# Patient Record
Sex: FEMALE | Race: WHITE | NOT HISPANIC OR LATINO | Employment: FULL TIME | URBAN - METROPOLITAN AREA
[De-identification: names, ages, dates, MRNs, and addresses within clinical notes are randomized per-mention and may not be internally consistent; named-entity substitution may affect disease eponyms.]

---

## 2020-05-11 ENCOUNTER — APPOINTMENT (OUTPATIENT)
Dept: LAB | Facility: HOSPITAL | Age: 40
End: 2020-05-11

## 2020-05-11 ENCOUNTER — OFFICE VISIT (OUTPATIENT)
Dept: ENDOCRINOLOGY | Facility: CLINIC | Age: 40
End: 2020-05-11

## 2020-05-11 VITALS
OXYGEN SATURATION: 98 % | HEIGHT: 72 IN | BODY MASS INDEX: 17.23 KG/M2 | DIASTOLIC BLOOD PRESSURE: 80 MMHG | SYSTOLIC BLOOD PRESSURE: 128 MMHG | WEIGHT: 127.2 LBS | HEART RATE: 75 BPM

## 2020-05-11 DIAGNOSIS — E89.0 POSTSURGICAL HYPOTHYROIDISM: Primary | ICD-10-CM

## 2020-05-11 DIAGNOSIS — E83.51 HYPOCALCEMIA: ICD-10-CM

## 2020-05-11 DIAGNOSIS — Z85.850 HISTORY OF THYROID CANCER: ICD-10-CM

## 2020-05-11 PROBLEM — F32.A DEPRESSED: Status: ACTIVE | Noted: 2020-05-11

## 2020-05-11 PROCEDURE — 84432 ASSAY OF THYROGLOBULIN: CPT | Performed by: INTERNAL MEDICINE

## 2020-05-11 PROCEDURE — 86800 THYROGLOBULIN ANTIBODY: CPT | Performed by: INTERNAL MEDICINE

## 2020-05-11 PROCEDURE — 84443 ASSAY THYROID STIM HORMONE: CPT | Performed by: INTERNAL MEDICINE

## 2020-05-11 PROCEDURE — 99204 OFFICE O/P NEW MOD 45 MIN: CPT | Performed by: INTERNAL MEDICINE

## 2020-05-11 PROCEDURE — 80053 COMPREHEN METABOLIC PANEL: CPT | Performed by: INTERNAL MEDICINE

## 2020-05-11 PROCEDURE — 84439 ASSAY OF FREE THYROXINE: CPT | Performed by: INTERNAL MEDICINE

## 2020-05-11 RX ORDER — CALCITRIOL 0.5 UG/1
0.5 CAPSULE, LIQUID FILLED ORAL DAILY
Qty: 90 CAPSULE | Refills: 3 | Status: SHIPPED | OUTPATIENT
Start: 2020-05-11 | End: 2021-05-12 | Stop reason: SDUPTHER

## 2020-05-11 RX ORDER — LINACLOTIDE 290 UG/1
1 CAPSULE, GELATIN COATED ORAL DAILY
COMMUNITY
Start: 2020-03-24

## 2020-05-11 RX ORDER — LEVOTHYROXINE SODIUM 0.12 MG/1
1 TABLET ORAL DAILY
COMMUNITY
Start: 2020-03-06 | End: 2020-05-14 | Stop reason: SDUPTHER

## 2020-05-11 RX ORDER — CALCITRIOL 0.5 UG/1
1 CAPSULE, LIQUID FILLED ORAL DAILY
COMMUNITY
Start: 2020-04-20 | End: 2020-05-11 | Stop reason: SDUPTHER

## 2020-05-11 RX ORDER — DESVENLAFAXINE 100 MG/1
1 TABLET, EXTENDED RELEASE ORAL DAILY
COMMUNITY
Start: 2020-03-30

## 2020-05-11 NOTE — PROGRESS NOTES
"Chief Complaint   Patient presents with   • Establish Care   • Hypothyroidism   • Hypocalcemia        Referring Provider  Marilu Almazan, *     HPI   Isabela Myles is a 40 y.o. female had concerns including Establish Care; Hypothyroidism; and Hypocalcemia.   Had thyroidectomy in 2009 followed by MARTIN ablation. She also had external beam radiation for thyroid cancer that had \"spread to the lymph nodes\".   She also had whole body scans yearly until a few years ago. Treatment was through [facility] in [place].     She was seeing Nader Taylor at [facility] in the past.     Her last TSH from March 3/4/20 was 6 and her levothyroxine was increased from 100 mcg to 125 mcg by PCP. TSH from 6/11/2019 was suppressed at 0.09.  TSH from 8/22/2019 was 0.22.  She does not recall what doses of levothyroxine she was on with these lab draws.  Notes that her dose was changed a few times over the last year or so.    She is due for thyroid labs today. She is feeling well. Was feeling poorly on the 100 mcg dose when her TSH was up to 6.0.    She has postoperative hypocalcemia since the thyroidectomy.  Immediately after surgery she was on up to 16 tablets of calcium supplementation through the day and she is now only on 2.  She also takes calcitriol daily.  Last calcium level from 6/11/2019 was normal at 9.9 with an albumin of 4.4.    Past Medical History:   Diagnosis Date   • Depression    • Hypocalcemia    • Postoperative hypothyroidism    • Stroke (cerebrum) (CMS/HCC)    • Thyroid cancer (CMS/HCC)      Past Surgical History:   Procedure Laterality Date   • THYROID SURGERY        Family History   Problem Relation Age of Onset   • Cancer Maternal Grandmother    • Diabetes Maternal Grandfather       Social History     Socioeconomic History   • Marital status:      Spouse name: Not on file   • Number of children: Not on file   • Years of education: Not on file   • Highest education level: Not on file   Tobacco Use   • " "Smoking status: Never Smoker   • Smokeless tobacco: Never Used   Substance and Sexual Activity   • Alcohol use: Never     Frequency: Never   • Drug use: Never   • Sexual activity: Defer      No Known Allergies   Current Outpatient Medications on File Prior to Visit   Medication Sig Dispense Refill   • desvenlafaxine (PRISTIQ) 100 MG 24 hr tablet Take 1 tablet by mouth Daily.     • levonorgestrel (MIRENA) 20 MCG/24HR IUD 1 each by Intrauterine route 1 (One) Time.     • levothyroxine (SYNTHROID, LEVOTHROID) 125 MCG tablet Take 1 tablet by mouth Daily.     • LINZESS 290 MCG capsule capsule Take 1 capsule by mouth Daily.     • [DISCONTINUED] calcitriol (ROCALTROL) 0.5 MCG capsule Take 1 capsule by mouth Daily.       No current facility-administered medications on file prior to visit.         Review of Systems   Constitutional: Negative.    HENT: Negative.    Eyes: Negative.    Respiratory: Negative.    Cardiovascular: Negative.    Gastrointestinal: Positive for constipation (pt suffers from constipation).   Endocrine: Positive for cold intolerance (pt is always cold).   Genitourinary: Negative.    Musculoskeletal: Negative.    Skin: Negative.    Allergic/Immunologic: Negative.    Neurological: Negative.    Psychiatric/Behavioral: Positive for depressed mood (pt takes med for depression and anxiety).      ROS was reviewed and verified. All other ROS negative.    /80   Pulse 75   Ht 198.1 cm (78\")   Wt 57.7 kg (127 lb 3.2 oz)   SpO2 98%   BMI 14.70 kg/m²      Physical Exam    Constitutional:  well developed; well nourished  no acute distress   ENT/Thyroid: surgically absent  no palpable lymphadenopathy   Eyes: EOM intact  Conjunctiva: clear   Respiratory:  breathing is unlabored  clear to auscultation bilaterally   Cardiovascular:  regular rate and rhythm, S1, S2 normal, no murmur, click, rub or gallop   Chest:  Not performed.   Abdomen: Not performed.   : Not performed.   Musculoskeletal: negative findings:  " ROM of all joints is normal, no deformities present   Skin: dry and warm   Neuro: normal without focal findings, mental status, speech normal, alert and oriented x3 and JACQUELINE   Psych: oriented to time, place and person, mood and affect are within normal limits     Labs  See HPI    Assessment and Plan    Isabela was seen today for establish care, hypothyroidism and hypocalcemia.    Diagnoses and all orders for this visit:    Postsurgical hypothyroidism  Recheck TFTs today. TSH goal is determined based on the pathology from her thyroidectomy for thyroid cancer in 2009. TSH should at least be < 2.0 due to history of thyroid cancer.   Currently on levothyroxine 125 mcg daily and pt is clinically euthyroid.   Records are being requested.   Titrate TSH if needed.  -     T4, Free  -     TSH    History of thyroid cancer  Records will be requested. Pt underwent complete thyroidectomy in 2009 and reports some involvement of lymph nodes. Treatment was followed up with MARTIN therapy and external beam radiation due to lymph node involvement.   Check neck US at follow-up. Routine or yearly whole body scans are not indicated unless TG level is trending up.   Check TG and antibody level.    -     Thyroglobulin + Thyroglobulin Antibody ([facility])    Hypocalcemia  Postoperative from thyroidectomy though controlled on calcitriol and calcium supplementation.   Calcium level was 9.9 from 6/2019. Recheck now. Refill calcitriol.   -     Comprehensive Metabolic Panel  -     calcitriol (ROCALTROL) 0.5 MCG capsule; Take 1 capsule by mouth Daily.     **Addendum 5/18/2020:    The operative report from patient's surgery on 12/8/2018 was received.  The final pathology report has not been received.  From the operative report there was gross capsular extension with adhesions to the surrounding strap muscles and to the undersurface of structures posteriorly, metastasis to the right upper jugular lymph node.  Initial TSH goal would have been less than 0.1.   Within 5 years TSH goal 0.1-0.5 and after 5 years with an excellent response maintenance of TSH less than 2 is appropriate.    Return in about 3 months (around 8/11/2020) for next scheduled follow up with ultrasound (30 minutes). The patient was instructed to contact the clinic with any interval questions or concerns.    Ema Robles, DO   Endocrinologist    Please note that portions of this document were completed with a voice recognition program. Efforts were made to edit the dictations, but occasionally words are mis-transcribed.

## 2020-05-12 LAB
ALBUMIN SERPL-MCNC: 4.6 G/DL (ref 3.5–5.2)
ALBUMIN/GLOB SERPL: 1.8 G/DL
ALP SERPL-CCNC: 43 U/L (ref 39–117)
ALT SERPL W P-5'-P-CCNC: 13 U/L (ref 1–33)
ANION GAP SERPL CALCULATED.3IONS-SCNC: 14.7 MMOL/L (ref 5–15)
AST SERPL-CCNC: 20 U/L (ref 1–32)
BILIRUB SERPL-MCNC: 0.5 MG/DL (ref 0.2–1.2)
BUN BLD-MCNC: 18 MG/DL (ref 6–20)
BUN/CREAT SERPL: 14.8 (ref 7–25)
CALCIUM SPEC-SCNC: 8.2 MG/DL (ref 8.6–10.5)
CHLORIDE SERPL-SCNC: 97 MMOL/L (ref 98–107)
CO2 SERPL-SCNC: 26.3 MMOL/L (ref 22–29)
CREAT BLD-MCNC: 1.22 MG/DL (ref 0.57–1)
GFR SERPL CREATININE-BSD FRML MDRD: 49 ML/MIN/1.73
GLOBULIN UR ELPH-MCNC: 2.5 GM/DL
GLUCOSE BLD-MCNC: 72 MG/DL (ref 65–99)
POTASSIUM BLD-SCNC: 3.9 MMOL/L (ref 3.5–5.2)
PROT SERPL-MCNC: 7.1 G/DL (ref 6–8.5)
SODIUM BLD-SCNC: 138 MMOL/L (ref 136–145)
T4 FREE SERPL-MCNC: 1.36 NG/DL (ref 0.93–1.7)
TSH SERPL DL<=0.05 MIU/L-ACNC: 1.36 UIU/ML (ref 0.27–4.2)

## 2020-05-14 ENCOUNTER — TELEPHONE (OUTPATIENT)
Dept: ENDOCRINOLOGY | Facility: CLINIC | Age: 40
End: 2020-05-14

## 2020-05-14 DIAGNOSIS — E89.0 POSTSURGICAL HYPOTHYROIDISM: Primary | ICD-10-CM

## 2020-05-14 PROBLEM — Z86.73 HISTORY OF CEREBROVASCULAR ACCIDENT: Status: ACTIVE | Noted: 2017-01-17

## 2020-05-14 LAB — REF LAB TEST METHOD: NORMAL

## 2020-05-14 RX ORDER — LEVOTHYROXINE SODIUM 0.12 MG/1
125 TABLET ORAL DAILY
Qty: 90 TABLET | Refills: 3 | Status: SHIPPED | OUTPATIENT
Start: 2020-05-14 | End: 2021-05-12 | Stop reason: SDUPTHER

## 2020-05-14 NOTE — TELEPHONE ENCOUNTER
Spoke with Javier in [facility] and they are checking in the old records for these, they stated they would have the OP note but possibly not the treatment notes. They are going to fax it all over today if they have it available. Spoke with Sherif Harris and they are faxing over her records today as well.

## 2020-05-14 NOTE — TELEPHONE ENCOUNTER
----- Message from Ema Robles DO sent at 5/12/2020 12:08 PM EDT -----  Regarding: records  Pt indicated she signed a records release at her visit yesterday but I don't see it scanned into her chart. I need her prior records from [facility] and records from [facility] in [place] from ~2009 on for her thyroid surgery and treatments. Can you help get these? May need to have her call [facility] medical records would be easiest and not sure if [facility] will send anything without records release. Thank you!

## 2020-08-06 ENCOUNTER — OFFICE VISIT (OUTPATIENT)
Dept: ENDOCRINOLOGY | Facility: CLINIC | Age: 40
End: 2020-08-06

## 2020-08-06 ENCOUNTER — LAB (OUTPATIENT)
Dept: LAB | Facility: HOSPITAL | Age: 40
End: 2020-08-06

## 2020-08-06 VITALS
HEART RATE: 66 BPM | OXYGEN SATURATION: 99 % | WEIGHT: 124.2 LBS | HEIGHT: 66 IN | DIASTOLIC BLOOD PRESSURE: 74 MMHG | SYSTOLIC BLOOD PRESSURE: 120 MMHG | BODY MASS INDEX: 19.96 KG/M2

## 2020-08-06 DIAGNOSIS — E83.51 HYPOCALCEMIA: ICD-10-CM

## 2020-08-06 DIAGNOSIS — Z85.850 HISTORY OF THYROID CANCER: Primary | ICD-10-CM

## 2020-08-06 DIAGNOSIS — E89.0 POSTSURGICAL HYPOTHYROIDISM: ICD-10-CM

## 2020-08-06 PROCEDURE — 99214 OFFICE O/P EST MOD 30 MIN: CPT | Performed by: INTERNAL MEDICINE

## 2020-08-06 PROCEDURE — 80053 COMPREHEN METABOLIC PANEL: CPT | Performed by: INTERNAL MEDICINE

## 2020-08-06 PROCEDURE — 76536 US EXAM OF HEAD AND NECK: CPT | Performed by: INTERNAL MEDICINE

## 2020-08-06 NOTE — PROGRESS NOTES
"Chief Complaint   Patient presents with   • Hypothyroidism     Follow up & U/S        HPI   Isabela Myles is a 40 y.o. female had concerns including Hypothyroidism (Follow up & U/S).      Here for follow up on postoperative hypothyroidism and thyroid cancer. Is on levothyroxine 125 mcg daily and last TSH from May was 1.36.     TG levels are low/undetectable with a negative thyroid antibody.     Pt has postoperative hypocalcemia also and recent calcium was slightly low. I advised one additional calcium tablet daily, which patient has been taking.     She feels great and denies any new concerns or complaints today.    The following portions of the patient's history were reviewed and updated as appropriate: allergies, current medications, past family history, past medical history, past social history, past surgical history and problem list.    Review of Systems   Constitutional: Negative.    HENT: Negative.    Eyes: Negative.    Respiratory: Negative.    Cardiovascular: Negative.    Gastrointestinal: Negative.    Endocrine: Negative.    Genitourinary: Negative.    Musculoskeletal: Negative.    Skin: Negative.    Allergic/Immunologic: Negative.    Neurological: Negative.    Hematological: Negative.    Psychiatric/Behavioral: Negative.       ROS was reviewed and verified. All other ROS negative.    /74   Pulse 66   Ht 167.6 cm (66\")   Wt 56.3 kg (124 lb 3.2 oz)   SpO2 99%   BMI 20.05 kg/m²      Physical Exam     Constitutional:  well developed; well nourished  no acute distress   ENT/Thyroid: surgically absent  no palpable lymphadenopathy   Eyes: EOM intact  Conjunctiva: clear   Respiratory:  breathing is unlabored  clear to auscultation bilaterally   Cardiovascular:  regular rate and rhythm, S1, S2 normal, no murmur, click, rub or gallop   Chest:  Not performed.   Abdomen: Not performed.   : Not performed.   Musculoskeletal: negative findings:  ROM of all joints is normal, no deformities present   Skin: dry " and warm   Neuro: normal without focal findings and mental status, speech normal, alert and oriented x3   Psych: oriented to time, place and person, mood and affect are within normal limits     CMP  Lab Results   Component Value Date    GLUCOSE 72 2020    BUN 18 2020    CREATININE 1.22 (H) 2020    EGFRIFNONA 49 (L) 2020    BCR 14.8 2020    K 3.9 2020    CO2 26.3 2020    CALCIUM 8.2 (L) 2020    ALBUMIN 4.60 2020    AST 20 2020    ALT 13 2020      TSH  Lab Results   Component Value Date    TSH 1.360 2020       T4  Lab Results   Component Value Date    FREET4 1.36 2020     Thyroglobulin from 2020 undetectable, <0.1 and thyroglobulin antibody undetectable <1.0.    Post-operative Neck Ultrasound  [facility]    PT: Isabela Myles  : 1980  Date:  20  Indication: History of thyroid cancer.    Technique: Real time high resolution imaging of the anterior neck was performed in longitudinal and transverse planes.    Findings:   Compartment / Level I  (Submandibular): no abnormal lymph nodules bilaterally  Compartment / Level II  (Suprahyoid parajugular): no abnormal lymph nodules bilaterally  Compartment / Level III  (Infrahyoid supracricoid parajugular): no abnormal lymph nodules bilaterally  Compartment / Level IV (Infracricoid parajugular): no abnormal lymph nodules bilaterally  Compartment / Level V (Posterior cervical triangle): no abnormal lymph nodules bilaterally  Compartment / Level VI (Central compartment): no abnormal lymph nodules identified  Compartment / Level VII (Substernal space): no abnormal lymph nodules bilaterally    Impression: The structures of the neck are shifted medially as expected post thyroidectomy. The thyroid bed is unremarkable.   No abnormal lymph nodes were identified in surgical anatomic compartments I - VII.      Recommendation: Repeat Neck US recommended as clinically  indicated.      Assessment and Plan    Isabela was seen today for hypothyroidism.    Diagnoses and all orders for this visit:    History of thyroid cancer  High risk papillary with gross capsular extension with adhesions to the surrounding strap muscles and to the undersurface of structures posteriorly, metastasis to the right upper jugular lymph node. Resection 12/8/2009. S/p I-131 and external beam radiation. Excellent response to therapy.   TG and TG antibody undetectable.   Neck US normal today. Repeat every 1-2 years as clinically indicated.   -     US Thyroid    Postsurgical hypothyroidism  Clinically euthyroid on levothyroxine 125 mcg daily.  Last TSH was at goal at 1.36 3 February 2020.  Target TSH 0.5-2.0 most patients greater than 5 years out from surgery.    Hypocalcemia  Hypocalcemia controlled with calcitriol and calcium tablets daily.  Increase her calcium tablets by 1 additional daily after her most recent calcium level is low.  Recheck level today.  She is symptomatic if calcium levels drop.  -     Comprehensive Metabolic Panel         Return in about 9 months (around 5/6/2021) for next scheduled follow up. The patient was instructed to contact the clinic with any interval questions or concerns.    Ema Robles, DO   Endocrinologist    Please note that portions of this document were completed with a voice recognition program. Efforts were made to edit the dictations, but occasionally words are mis-transcribed.

## 2020-08-07 LAB
ALBUMIN SERPL-MCNC: 4.5 G/DL (ref 3.5–5.2)
ALBUMIN/GLOB SERPL: 1.9 G/DL
ALP SERPL-CCNC: 40 U/L (ref 39–117)
ALT SERPL W P-5'-P-CCNC: 11 U/L (ref 1–33)
ANION GAP SERPL CALCULATED.3IONS-SCNC: 13.3 MMOL/L (ref 5–15)
AST SERPL-CCNC: 20 U/L (ref 1–32)
BILIRUB SERPL-MCNC: 0.3 MG/DL (ref 0–1.2)
BUN SERPL-MCNC: 15 MG/DL (ref 6–20)
BUN/CREAT SERPL: 12.4 (ref 7–25)
CALCIUM SPEC-SCNC: 7.9 MG/DL (ref 8.6–10.5)
CHLORIDE SERPL-SCNC: 98 MMOL/L (ref 98–107)
CO2 SERPL-SCNC: 27.7 MMOL/L (ref 22–29)
CREAT SERPL-MCNC: 1.21 MG/DL (ref 0.57–1)
GFR SERPL CREATININE-BSD FRML MDRD: 49 ML/MIN/1.73
GLOBULIN UR ELPH-MCNC: 2.4 GM/DL
GLUCOSE SERPL-MCNC: 52 MG/DL (ref 65–99)
POTASSIUM SERPL-SCNC: 3.1 MMOL/L (ref 3.5–5.2)
PROT SERPL-MCNC: 6.9 G/DL (ref 6–8.5)
SODIUM SERPL-SCNC: 139 MMOL/L (ref 136–145)

## 2020-08-12 DIAGNOSIS — E83.51 HYPOCALCEMIA: Primary | ICD-10-CM

## 2020-08-12 RX ORDER — PHENOL 1.4 %
1200 AEROSOL, SPRAY (ML) MUCOUS MEMBRANE DAILY
COMMUNITY

## 2020-09-12 ENCOUNTER — RESULTS ENCOUNTER (OUTPATIENT)
Dept: ENDOCRINOLOGY | Facility: CLINIC | Age: 40
End: 2020-09-12

## 2020-09-12 DIAGNOSIS — E83.51 HYPOCALCEMIA: ICD-10-CM

## 2021-05-03 ENCOUNTER — OFFICE VISIT (OUTPATIENT)
Dept: ENDOCRINOLOGY | Facility: CLINIC | Age: 41
End: 2021-05-03

## 2021-05-03 ENCOUNTER — LAB (OUTPATIENT)
Dept: LAB | Facility: HOSPITAL | Age: 41
End: 2021-05-03

## 2021-05-03 VITALS
OXYGEN SATURATION: 99 % | TEMPERATURE: 98.4 F | HEIGHT: 66 IN | SYSTOLIC BLOOD PRESSURE: 120 MMHG | DIASTOLIC BLOOD PRESSURE: 80 MMHG | WEIGHT: 128.4 LBS | BODY MASS INDEX: 20.63 KG/M2 | HEART RATE: 74 BPM

## 2021-05-03 DIAGNOSIS — E83.51 HYPOCALCEMIA: ICD-10-CM

## 2021-05-03 DIAGNOSIS — Z85.850 HISTORY OF THYROID CANCER: ICD-10-CM

## 2021-05-03 DIAGNOSIS — E89.0 POSTSURGICAL HYPOTHYROIDISM: Primary | ICD-10-CM

## 2021-05-03 LAB
25(OH)D3 SERPL-MCNC: 84.5 NG/ML
ALBUMIN SERPL-MCNC: 4.5 G/DL (ref 3.5–5.2)
ALBUMIN/GLOB SERPL: 1.8 G/DL
ALP SERPL-CCNC: 54 U/L (ref 39–117)
ALT SERPL W P-5'-P-CCNC: 14 U/L (ref 1–33)
ANION GAP SERPL CALCULATED.3IONS-SCNC: 12.4 MMOL/L (ref 5–15)
AST SERPL-CCNC: 17 U/L (ref 1–32)
BILIRUB SERPL-MCNC: 0.3 MG/DL (ref 0–1.2)
BUN SERPL-MCNC: 16 MG/DL (ref 6–20)
BUN/CREAT SERPL: 12.7 (ref 7–25)
CA-I BLD-MCNC: 5.6 MG/DL (ref 4.6–5.4)
CA-I SERPL ISE-MCNC: 1.4 MMOL/L (ref 1.15–1.35)
CALCIUM SPEC-SCNC: 10 MG/DL (ref 8.6–10.5)
CHLORIDE SERPL-SCNC: 99 MMOL/L (ref 98–107)
CO2 SERPL-SCNC: 29.6 MMOL/L (ref 22–29)
CREAT SERPL-MCNC: 1.26 MG/DL (ref 0.57–1)
GFR SERPL CREATININE-BSD FRML MDRD: 47 ML/MIN/1.73
GLOBULIN UR ELPH-MCNC: 2.5 GM/DL
GLUCOSE SERPL-MCNC: 63 MG/DL (ref 65–99)
POTASSIUM SERPL-SCNC: 3.6 MMOL/L (ref 3.5–5.2)
PROT SERPL-MCNC: 7 G/DL (ref 6–8.5)
SODIUM SERPL-SCNC: 141 MMOL/L (ref 136–145)
T4 FREE SERPL-MCNC: 1.11 NG/DL (ref 0.93–1.7)
TSH SERPL DL<=0.05 MIU/L-ACNC: 9.48 UIU/ML (ref 0.27–4.2)

## 2021-05-03 PROCEDURE — 36415 COLL VENOUS BLD VENIPUNCTURE: CPT | Performed by: INTERNAL MEDICINE

## 2021-05-03 PROCEDURE — 82306 VITAMIN D 25 HYDROXY: CPT | Performed by: INTERNAL MEDICINE

## 2021-05-03 PROCEDURE — 86800 THYROGLOBULIN ANTIBODY: CPT | Performed by: INTERNAL MEDICINE

## 2021-05-03 PROCEDURE — 99214 OFFICE O/P EST MOD 30 MIN: CPT | Performed by: INTERNAL MEDICINE

## 2021-05-03 PROCEDURE — 82330 ASSAY OF CALCIUM: CPT | Performed by: INTERNAL MEDICINE

## 2021-05-03 PROCEDURE — 80053 COMPREHEN METABOLIC PANEL: CPT | Performed by: INTERNAL MEDICINE

## 2021-05-03 PROCEDURE — 84439 ASSAY OF FREE THYROXINE: CPT | Performed by: INTERNAL MEDICINE

## 2021-05-03 PROCEDURE — 84443 ASSAY THYROID STIM HORMONE: CPT | Performed by: INTERNAL MEDICINE

## 2021-05-03 PROCEDURE — 84432 ASSAY OF THYROGLOBULIN: CPT | Performed by: INTERNAL MEDICINE

## 2021-05-03 NOTE — PROGRESS NOTES
"Chief Complaint   Patient presents with   • Follow-up   • Thyroid Problem        HPI   Isabela Myles is a 41 y.o. female had concerns including Follow-up and Thyroid Problem.      Here for follow-up on thyroid cancer and hypocalcemia.  She is on calcitriol daily as well as calcium plus D supplements, 5 tabs daily.    Had labs checked last week, not with thyroid function.    She feels well denies any new concerns or complaints today.    The following portions of the patient's history were reviewed and updated as appropriate: allergies, current medications, past family history, past medical history, past social history, past surgical history and problem list.    Review of Systems   Constitutional: Negative.    Endocrine: Negative.         /80   Pulse 74   Temp 98.4 °F (36.9 °C) (Infrared)   Ht 167.6 cm (66\")   Wt 58.2 kg (128 lb 6.4 oz)   SpO2 99%   BMI 20.72 kg/m²      Physical Exam     Constitutional:  well developed; well nourished  no acute distress   ENT/Thyroid: surgically absent  no palpable lymphadenopathy   Eyes: EOM intact  Conjunctiva: clear   Respiratory:  breathing is unlabored  clear to auscultation bilaterally   Cardiovascular:  regular rate and rhythm, S1, S2 normal, no murmur   Chest:  Not performed.   Abdomen: Not performed.   : Not performed.   Musculoskeletal: negative findings:  ROM of all joints is normal, no deformities present   Skin: dry and warm   Neuro: normal without focal findings and mental status, speech normal, alert and oriented x3   Psych: oriented to time, place and person, mood and affect are within normal limits         CMP  Lab Results   Component Value Date    GLUCOSE 52 (L) 08/06/2020    BUN 15 08/06/2020    CREATININE 1.21 (H) 08/06/2020    EGFRIFNONA 49 (L) 08/06/2020    BCR 12.4 08/06/2020    K 3.1 (L) 08/06/2020    CO2 27.7 08/06/2020    CALCIUM 7.9 (L) 08/06/2020    ALBUMIN 4.50 08/06/2020    AST 20 08/06/2020    ALT 11 08/06/2020     TSH  Lab Results   Component " Value Date    TSH 1.360 2020       T4  Lab Results   Component Value Date    FREET4 1.36 2020     Thyroglobulin from 2020 undetectable, <0.1 and thyroglobulin antibody undetectable <1.0.     Post-operative Neck Ultrasound  [facility]     PT: Isabela Myles  : 1980  Date:  20  Indication: History of thyroid cancer.     Technique: Real time high resolution imaging of the anterior neck was performed in longitudinal and transverse planes.     Findings:   Compartment / Level I  (Submandibular): no abnormal lymph nodules bilaterally  Compartment / Level II  (Suprahyoid parajugular): no abnormal lymph nodules bilaterally  Compartment / Level III  (Infrahyoid supracricoid parajugular): no abnormal lymph nodules bilaterally  Compartment / Level IV (Infracricoid parajugular): no abnormal lymph nodules bilaterally  Compartment / Level V (Posterior cervical triangle): no abnormal lymph nodules bilaterally  Compartment / Level VI (Central compartment): no abnormal lymph nodules identified  Compartment / Level VII (Substernal space): no abnormal lymph nodules bilaterally     Impression: The structures of the neck are shifted medially as expected post thyroidectomy. The thyroid bed is unremarkable.   No abnormal lymph nodes were identified in surgical anatomic compartments I - VII.       Recommendation: Repeat Neck US recommended as clinically indicated.       Assessment and Plan    Diagnoses and all orders for this visit:    1. Postsurgical hypothyroidism (Primary)  Clinically thyroid on levothyroxine 125 mcg daily.  Recheck TFTs.  Target TSH less than 2.  -     TSH  -     T4, Free    2. History of thyroid cancer  High risk papillary with gross capsular extension with adhesions to the surrounding strap muscles and to the undersurface of structures posteriorly, metastasis to the right upper jugular lymph node. Resection 2009. S/p I-131 and external beam radiation. Excellent  response to therapy.  Recheck thyroglobulin with antibodies today.  Levels have been undetectable.  Repeat neck ultrasound at follow-up visit.  Last was checked 8/6/2020.  -     Thyroglobulin + Thyroglobulin Antibody ([facility])    3. Hypocalcemia  Calcium supplementation was increased after her last visit. Is on calcium + D supplement in addition to calcitriol.  Repeat levels with vitamin D today.  -     Calcium, Ionized  -     Comprehensive Metabolic Panel  -     Vitamin D 25 Hydroxy         Return in about 1 year (around 5/3/2022) for next scheduled follow up with thyroid ultrasound. The patient was instructed to contact the clinic with any interval questions or concerns.    Ema Robles, DO   Endocrinologist    Please note that portions of this document were completed with a voice recognition program. Efforts were made to edit the dictations, but occasionally words are mis-transcribed.

## 2021-05-05 LAB — REF LAB TEST METHOD: NORMAL

## 2021-05-10 DIAGNOSIS — E89.0 POSTSURGICAL HYPOTHYROIDISM: Primary | ICD-10-CM

## 2021-05-12 DIAGNOSIS — E83.51 HYPOCALCEMIA: ICD-10-CM

## 2021-05-12 DIAGNOSIS — E89.0 POSTSURGICAL HYPOTHYROIDISM: ICD-10-CM

## 2021-05-12 RX ORDER — CALCITRIOL 0.5 UG/1
0.5 CAPSULE, LIQUID FILLED ORAL DAILY
Qty: 90 CAPSULE | Refills: 3 | Status: SHIPPED | OUTPATIENT
Start: 2021-05-12 | End: 2022-05-09 | Stop reason: SDUPTHER

## 2021-05-12 RX ORDER — LEVOTHYROXINE SODIUM 0.12 MG/1
125 TABLET ORAL DAILY
Qty: 90 TABLET | Refills: 3 | Status: SHIPPED | OUTPATIENT
Start: 2021-05-12 | End: 2022-05-27 | Stop reason: SDUPTHER

## 2021-05-12 NOTE — TELEPHONE ENCOUNTER
Isabela Myles is calling to have prescriptions sent over to pharmacy.    The prescriptions are Levothroxine and calcitriol

## 2022-05-09 DIAGNOSIS — E83.51 HYPOCALCEMIA: ICD-10-CM

## 2022-05-09 RX ORDER — CALCITRIOL 0.5 UG/1
0.5 CAPSULE, LIQUID FILLED ORAL DAILY
Qty: 30 CAPSULE | Refills: 0 | Status: SHIPPED | OUTPATIENT
Start: 2022-05-09 | End: 2022-05-31 | Stop reason: SDUPTHER

## 2022-05-09 RX ORDER — CALCITRIOL 0.5 UG/1
CAPSULE, LIQUID FILLED ORAL
Qty: 90 CAPSULE | Refills: 0 | OUTPATIENT
Start: 2022-05-09

## 2022-05-09 NOTE — TELEPHONE ENCOUNTER
Please call in for pt her rocaltrol to [facility] pharmacy    pts number  [phone]

## 2022-05-26 ENCOUNTER — OFFICE VISIT (OUTPATIENT)
Dept: ENDOCRINOLOGY | Facility: CLINIC | Age: 42
End: 2022-05-26

## 2022-05-26 ENCOUNTER — LAB (OUTPATIENT)
Dept: LAB | Facility: HOSPITAL | Age: 42
End: 2022-05-26

## 2022-05-26 ENCOUNTER — TELEPHONE (OUTPATIENT)
Dept: ENDOCRINOLOGY | Facility: CLINIC | Age: 42
End: 2022-05-26

## 2022-05-26 VITALS
BODY MASS INDEX: 21.83 KG/M2 | WEIGHT: 131 LBS | DIASTOLIC BLOOD PRESSURE: 78 MMHG | SYSTOLIC BLOOD PRESSURE: 122 MMHG | HEIGHT: 65 IN | HEART RATE: 81 BPM | OXYGEN SATURATION: 99 %

## 2022-05-26 DIAGNOSIS — E89.0 POSTSURGICAL HYPOTHYROIDISM: Primary | ICD-10-CM

## 2022-05-26 DIAGNOSIS — E83.52 HYPERCALCEMIA: ICD-10-CM

## 2022-05-26 DIAGNOSIS — N18.31 STAGE 3A CHRONIC KIDNEY DISEASE: ICD-10-CM

## 2022-05-26 DIAGNOSIS — Z85.850 HISTORY OF THYROID CANCER: ICD-10-CM

## 2022-05-26 LAB
CA-I BLD-MCNC: 5.6 MG/DL (ref 4.6–5.4)
CA-I SERPL ISE-MCNC: 1.39 MMOL/L (ref 1.15–1.35)

## 2022-05-26 PROCEDURE — 80053 COMPREHEN METABOLIC PANEL: CPT | Performed by: INTERNAL MEDICINE

## 2022-05-26 PROCEDURE — 82330 ASSAY OF CALCIUM: CPT | Performed by: INTERNAL MEDICINE

## 2022-05-26 PROCEDURE — 84439 ASSAY OF FREE THYROXINE: CPT | Performed by: INTERNAL MEDICINE

## 2022-05-26 PROCEDURE — 86800 THYROGLOBULIN ANTIBODY: CPT | Performed by: INTERNAL MEDICINE

## 2022-05-26 PROCEDURE — 84443 ASSAY THYROID STIM HORMONE: CPT | Performed by: INTERNAL MEDICINE

## 2022-05-26 PROCEDURE — 84432 ASSAY OF THYROGLOBULIN: CPT | Performed by: INTERNAL MEDICINE

## 2022-05-26 PROCEDURE — 99214 OFFICE O/P EST MOD 30 MIN: CPT | Performed by: INTERNAL MEDICINE

## 2022-05-26 PROCEDURE — 83970 ASSAY OF PARATHORMONE: CPT | Performed by: INTERNAL MEDICINE

## 2022-05-26 PROCEDURE — 76536 US EXAM OF HEAD AND NECK: CPT | Performed by: INTERNAL MEDICINE

## 2022-05-26 PROCEDURE — 82306 VITAMIN D 25 HYDROXY: CPT | Performed by: INTERNAL MEDICINE

## 2022-05-26 RX ORDER — CETIRIZINE HYDROCHLORIDE 10 MG/1
TABLET ORAL
COMMUNITY
Start: 2022-05-12

## 2022-05-26 NOTE — TELEPHONE ENCOUNTER
Because we checked the thyroglobulin levels and send those to [facility], prefer to do those labs here.  She can have the thyroid function checked locally but would need to have blood draw twice then.  What would she prefer to do?

## 2022-05-26 NOTE — TELEPHONE ENCOUNTER
Pt has an appt for 5/25/2023  She wanted to get labs drawn before this appt can we mail her an order to get these done

## 2022-05-27 DIAGNOSIS — E83.52 HYPERCALCEMIA: Primary | ICD-10-CM

## 2022-05-27 DIAGNOSIS — E89.0 POSTSURGICAL HYPOTHYROIDISM: ICD-10-CM

## 2022-05-27 LAB
25(OH)D3 SERPL-MCNC: 78.1 NG/ML (ref 30–100)
ALBUMIN SERPL-MCNC: 4.8 G/DL (ref 3.5–5.2)
ALBUMIN/GLOB SERPL: 1.8 G/DL
ALP SERPL-CCNC: 50 U/L (ref 39–117)
ALT SERPL W P-5'-P-CCNC: 15 U/L (ref 1–33)
ANION GAP SERPL CALCULATED.3IONS-SCNC: 11.2 MMOL/L (ref 5–15)
AST SERPL-CCNC: 24 U/L (ref 1–32)
BILIRUB SERPL-MCNC: 0.3 MG/DL (ref 0–1.2)
BUN SERPL-MCNC: 21 MG/DL (ref 6–20)
BUN/CREAT SERPL: 15.9 (ref 7–25)
CALCIUM SPEC-SCNC: 10.1 MG/DL (ref 8.6–10.5)
CHLORIDE SERPL-SCNC: 100 MMOL/L (ref 98–107)
CO2 SERPL-SCNC: 25.8 MMOL/L (ref 22–29)
CREAT SERPL-MCNC: 1.32 MG/DL (ref 0.57–1)
EGFRCR SERPLBLD CKD-EPI 2021: 51.5 ML/MIN/1.73
GLOBULIN UR ELPH-MCNC: 2.6 GM/DL
GLUCOSE SERPL-MCNC: 80 MG/DL (ref 65–99)
POTASSIUM SERPL-SCNC: 3.7 MMOL/L (ref 3.5–5.2)
PROT SERPL-MCNC: 7.4 G/DL (ref 6–8.5)
PTH-INTACT SERPL-MCNC: 2.9 PG/ML (ref 15–65)
REF LAB TEST METHOD: NORMAL
SODIUM SERPL-SCNC: 137 MMOL/L (ref 136–145)
T4 FREE SERPL-MCNC: 1.24 NG/DL (ref 0.93–1.7)
TSH SERPL DL<=0.05 MIU/L-ACNC: 7.07 UIU/ML (ref 0.27–4.2)

## 2022-05-27 RX ORDER — LEVOTHYROXINE SODIUM 137 UG/1
137 TABLET ORAL DAILY
Qty: 30 TABLET | Refills: 5 | Status: SHIPPED | OUTPATIENT
Start: 2022-05-27 | End: 2022-11-22 | Stop reason: SDUPTHER

## 2022-11-22 DIAGNOSIS — E89.0 POSTSURGICAL HYPOTHYROIDISM: ICD-10-CM

## 2022-11-22 RX ORDER — LEVOTHYROXINE SODIUM 137 UG/1
137 TABLET ORAL DAILY
Qty: 90 TABLET | Refills: 1 | Status: SHIPPED | OUTPATIENT
Start: 2022-11-22

## 2023-05-25 ENCOUNTER — TELEPHONE (OUTPATIENT)
Dept: ENDOCRINOLOGY | Facility: CLINIC | Age: 43
End: 2023-05-25

## 2023-05-25 DIAGNOSIS — E83.51 HYPOCALCEMIA: ICD-10-CM

## 2023-05-25 DIAGNOSIS — E89.0 POSTSURGICAL HYPOTHYROIDISM: ICD-10-CM

## 2023-05-25 RX ORDER — LEVOTHYROXINE SODIUM 137 UG/1
137 TABLET ORAL DAILY
Qty: 90 TABLET | Refills: 1 | Status: SHIPPED | OUTPATIENT
Start: 2023-05-25

## 2023-05-25 RX ORDER — CALCITRIOL 0.5 UG/1
0.5 CAPSULE, LIQUID FILLED ORAL DAILY
Qty: 90 CAPSULE | Refills: 1 | Status: SHIPPED | OUTPATIENT
Start: 2023-05-25

## 2023-05-25 NOTE — TELEPHONE ENCOUNTER
Caller: Isabela Myles    Relationship to patient: Self    Best call back number: [phone]    Patient HAD TO RESCHEDULE HER APPT FROM TODAY. SOONEST AVAILABLE WAS OCT. HUB ADDED PATIENT TO THE WAITLIST.     PATIENT IS ALMOST OUT OF HER LEVOTHYROXINE.     PLEASE CALL PATIENT TO DISCUSS MEDICATION CONTINUATION UNTIL HER OCT APPT. SHE IS OUT OF REFILLS     SHE WILL ALSO BE OUT OF [place] SOON.

## 2023-05-25 NOTE — TELEPHONE ENCOUNTER
Spoke with patient.  She states that she had to reschedule appointment from today.  She took her last pill today.  She is requesting a refill on levothyroxine and calcitriol until her scheduled follow up in October.  Last appointment 05/26/2022.

## 2023-10-09 ENCOUNTER — OFFICE VISIT (OUTPATIENT)
Dept: ENDOCRINOLOGY | Facility: CLINIC | Age: 43
End: 2023-10-09
Payer: COMMERCIAL

## 2023-10-09 VITALS
OXYGEN SATURATION: 97 % | HEIGHT: 65 IN | WEIGHT: 129 LBS | HEART RATE: 80 BPM | SYSTOLIC BLOOD PRESSURE: 122 MMHG | DIASTOLIC BLOOD PRESSURE: 74 MMHG | BODY MASS INDEX: 21.49 KG/M2

## 2023-10-09 DIAGNOSIS — E89.0 POSTSURGICAL HYPOTHYROIDISM: Primary | ICD-10-CM

## 2023-10-09 DIAGNOSIS — Z85.850 HISTORY OF THYROID CANCER: ICD-10-CM

## 2023-10-09 DIAGNOSIS — E83.51 HYPOCALCEMIA: ICD-10-CM

## 2023-10-09 LAB
ALBUMIN SERPL-MCNC: 4.9 G/DL (ref 3.5–5.2)
ALBUMIN/GLOB SERPL: 1.9 G/DL
ALP SERPL-CCNC: 50 U/L (ref 39–117)
ALT SERPL W P-5'-P-CCNC: 14 U/L (ref 1–33)
ANION GAP SERPL CALCULATED.3IONS-SCNC: 12 MMOL/L (ref 5–15)
AST SERPL-CCNC: 22 U/L (ref 1–32)
BILIRUB SERPL-MCNC: 0.4 MG/DL (ref 0–1.2)
BUN SERPL-MCNC: 18 MG/DL (ref 6–20)
BUN/CREAT SERPL: 16.2 (ref 7–25)
CALCIUM SPEC-SCNC: 9.9 MG/DL (ref 8.6–10.5)
CHLORIDE SERPL-SCNC: 99 MMOL/L (ref 98–107)
CO2 SERPL-SCNC: 29 MMOL/L (ref 22–29)
CREAT SERPL-MCNC: 1.11 MG/DL (ref 0.57–1)
EGFRCR SERPLBLD CKD-EPI 2021: 63 ML/MIN/1.73
GLOBULIN UR ELPH-MCNC: 2.6 GM/DL
GLUCOSE SERPL-MCNC: 89 MG/DL (ref 65–99)
POTASSIUM SERPL-SCNC: 3.4 MMOL/L (ref 3.5–5.2)
PROT SERPL-MCNC: 7.5 G/DL (ref 6–8.5)
SODIUM SERPL-SCNC: 140 MMOL/L (ref 136–145)
T4 FREE SERPL-MCNC: 1.61 NG/DL (ref 0.93–1.7)
TSH SERPL DL<=0.05 MIU/L-ACNC: 0.67 UIU/ML (ref 0.27–4.2)

## 2023-10-09 PROCEDURE — 84439 ASSAY OF FREE THYROXINE: CPT | Performed by: INTERNAL MEDICINE

## 2023-10-09 PROCEDURE — 76536 US EXAM OF HEAD AND NECK: CPT | Performed by: INTERNAL MEDICINE

## 2023-10-09 PROCEDURE — 84443 ASSAY THYROID STIM HORMONE: CPT | Performed by: INTERNAL MEDICINE

## 2023-10-09 PROCEDURE — 99214 OFFICE O/P EST MOD 30 MIN: CPT | Performed by: INTERNAL MEDICINE

## 2023-10-09 PROCEDURE — 36415 COLL VENOUS BLD VENIPUNCTURE: CPT | Performed by: INTERNAL MEDICINE

## 2023-10-09 PROCEDURE — 86800 THYROGLOBULIN ANTIBODY: CPT | Performed by: INTERNAL MEDICINE

## 2023-10-09 PROCEDURE — 84432 ASSAY OF THYROGLOBULIN: CPT | Performed by: INTERNAL MEDICINE

## 2023-10-09 PROCEDURE — 80053 COMPREHEN METABOLIC PANEL: CPT | Performed by: INTERNAL MEDICINE

## 2023-10-09 NOTE — PROGRESS NOTES
"Chief Complaint   Patient presents with    Hypothyroidism        HPI   Isabela Myles is a 43 y.o. female had concerns including Hypothyroidism.      Here for follow-up on hypothyroidism and history of thyroid cancer.    She is on levothyroxine 137 mcg daily.  She also has postoperative hypocalcemia for which she takes calcitriol 0.5 mcg daily, calcium 1200 mg daily.    Rarely has symptoms of hypocalcemia for which she takes a few extra Tums.  Symptoms resolve.    The following portions of the patient's history were reviewed and updated as appropriate: allergies, current medications, past family history, past medical history, past social history, past surgical history, and problem list.    Review of Systems   Constitutional: Negative.    Endocrine: Negative.         /74   Pulse 80   Ht 165.1 cm (65\")   Wt 58.5 kg (129 lb)   SpO2 97%   BMI 21.47 kg/mý      Physical Exam  Vitals reviewed.   Constitutional:       Appearance: Normal appearance.   Neck:      Comments: Thyroid surgically absent  Cardiovascular:      Rate and Rhythm: Normal rate.   Pulmonary:      Effort: Pulmonary effort is normal.   Lymphadenopathy:      Cervical: No cervical adenopathy.   Neurological:      General: No focal deficit present.      Mental Status: She is alert. Mental status is at baseline.   Psychiatric:         Mood and Affect: Mood normal.         Behavior: Behavior normal.              LABS AND IMAGING   CMP  Lab Results   Component Value Date    GLUCOSE 80 05/26/2022    BUN 21 (H) 05/26/2022    CREATININE 1.32 (H) 05/26/2022    EGFRIFNONA 47 (L) 05/03/2021    BCR 15.9 05/26/2022    K 3.7 05/26/2022    CO2 25.8 05/26/2022    CALCIUM 10.1 05/26/2022    ALBUMIN 4.80 05/26/2022    AST 24 05/26/2022    ALT 15 05/26/2022      TSH  Lab Results   Component Value Date    TSH 7.070 (H) 05/26/2022    TSH 9.480 (H) 05/03/2021    TSH 1.360 05/11/2020     T4  Lab Results   Component Value Date    FREET4 1.24 05/26/2022    FREET4 1.11 " 2021    FREET4 1.36 2020 creatinine 1.18, GFR 59, calcium 9.7, TSH 0.775, free T4 1.53  2022 thyroglobulin less than 0.1, thyroglobulin antibody less than 1.0  2021 thyroglobulin antibody less than 1, thyroglobulin less than 0.1  2020 Thyroglobulin undetectable, <0.1 and thyroglobulin antibody undetectable <1.0     Post-operative Neck Ultrasound  [facility]     PT: Isabela Myles  : 1980  Date:  20  Indication: History of thyroid cancer.     Technique: Real time high resolution imaging of the anterior neck was performed in longitudinal and transverse planes.     Findings:   Compartment / Level I  (Submandibular): no abnormal lymph nodules bilaterally  Compartment / Level II  (Suprahyoid parajugular): no abnormal lymph nodules bilaterally  Compartment / Level III  (Infrahyoid supracricoid parajugular): no abnormal lymph nodules bilaterally  Compartment / Level IV (Infracricoid parajugular): no abnormal lymph nodules bilaterally  Compartment / Level V (Posterior cervical triangle): no abnormal lymph nodules bilaterally  Compartment / Level VI (Central compartment): no abnormal lymph nodules identified  Compartment / Level VII (Substernal space): no abnormal lymph nodules bilaterally     Impression: The structures of the neck are shifted medially as expected post thyroidectomy. The thyroid bed is unremarkable.   No abnormal lymph nodes were identified in surgical anatomic compartments I - VII.       Recommendation: Repeat Neck US recommended as clinically indicated.           Post-operative Neck Ultrasound  [facility]      PT: Isabela Myles  : 1980  Date:  22  Indication: History of thyroid cancer.     Technique: Real time high resolution imaging of the anterior neck was performed in longitudinal and transverse planes.     Findings:   Compartment / Level I  (Submandibular): no abnormal lymph nodules  bilaterally  Compartment / Level II  (Suprahyoid parajugular): no abnormal lymph nodules bilaterally  Compartment / Level III  (Infrahyoid supracricoid parajugular): no abnormal lymph nodules bilaterally  Compartment / Level IV (Infracricoid parajugular): no abnormal lymph nodules bilaterally  Compartment / Level V (Posterior cervical triangle): no abnormal lymph nodules bilaterally  Compartment / Level VI (Central compartment): no abnormal lymph nodules identified  Compartment / Level VII (Substernal space): no abnormal lymph nodules bilaterally     Impression: The structures of the neck are shifted medially as expected post thyroidectomy. The thyroid bed is unremarkable.   No abnormal lymph nodes were identified in surgical anatomic compartments I - VII.       Recommendation: Repeat Neck US recommended as clinically indicated.      Post-operative Neck Ultrasound  [facility]    PT: Isabela Myles  : 1980  Date:  10/09/23  Indication: History of thyroid cancer.    Technique: Real time high resolution imaging of the anterior neck was performed in longitudinal and transverse planes.    Findings:   Compartment / Level I  (Submandibular): no abnormal lymph nodules bilaterally  Compartment / Level II  (Suprahyoid parajugular): no abnormal lymph nodules bilaterally  Compartment / Level III  (Infrahyoid supracricoid parajugular): no abnormal lymph nodules bilaterally  Compartment / Level IV (Infracricoid parajugular): no abnormal lymph nodules bilaterally  Compartment / Level V (Posterior cervical triangle): no abnormal lymph nodules bilaterally  Compartment / Level VI (Central compartment): no abnormal lymph nodules identified  Compartment / Level VII (Substernal space): no abnormal lymph nodules bilaterally    Impression: The structures of the neck are shifted medially as expected post thyroidectomy. The thyroid bed is unremarkable.   No abnormal lymph nodes were identified in surgical anatomic  compartments I - VII.      Recommendation: Repeat Neck US in 2 years.              Assessment and Plan    Diagnoses and all orders for this visit:    1. Postsurgical hypothyroidism (Primary)  TSH target less than 2.  On levothyroxine 137 mcg daily.  Clinically euthyroid.  Repeat TFTs today and titrate levothyroxine if needed.  -     T4, Free  -     TSH    2. Hypocalcemia  Postoperative hypocalcemia.  On calcitriol 0.5 mcg daily and calcium 1200 mg daily.  Rarely needs to supplement additional Tums for muscle spasms.  Check metabolic panel today.  -     Comprehensive Metabolic Panel    3. History of thyroid cancer  High risk papillary with gross capsular extension with adhesions to the surrounding strap muscles and to the undersurface of structures posteriorly, metastasis to the right upper jugular lymph node. Resection 12/8/2009. S/p I-131 and external beam radiation. Excellent response to therapy.  Recheck thyroglobulin with antibodies today.  Levels have been undetectable.  Last checked last 5/2022.  Neck ultrasound updated today showing no remnant thyroid tissue, no suspicious lymph nodes.  Repeat ultrasound every 2 years if thyroglobulin levels remain undetectable.  -     US Thyroid  -     Thyroglobulin Antibody and Thyroglobulin, KELVIN or LC/MS-MS         Return in about 1 year (around 10/9/2024) for next scheduled follow up. The patient was instructed to contact the clinic with any interval questions or concerns.    Ema Robles, DO   Endocrinologist    Please note that portions of this note were completed with a voice recognition program.

## 2023-10-10 DIAGNOSIS — E83.51 HYPOCALCEMIA: ICD-10-CM

## 2023-10-10 DIAGNOSIS — E89.0 POSTSURGICAL HYPOTHYROIDISM: ICD-10-CM

## 2023-10-10 LAB
THYROGLOB AB SERPL-ACNC: <1 IU/ML (ref 0–0.9)
THYROGLOB SERPL-MCNC: <0.1 NG/ML (ref 1.5–38.5)

## 2023-10-10 RX ORDER — LEVOTHYROXINE SODIUM 137 UG/1
137 TABLET ORAL DAILY
Qty: 90 TABLET | Refills: 3 | Status: SHIPPED | OUTPATIENT
Start: 2023-10-10

## 2023-10-10 RX ORDER — CALCITRIOL 0.5 UG/1
0.5 CAPSULE, LIQUID FILLED ORAL DAILY
Qty: 90 CAPSULE | Refills: 3 | Status: SHIPPED | OUTPATIENT
Start: 2023-10-10

## 2024-05-16 ENCOUNTER — PRIOR AUTHORIZATION (OUTPATIENT)
Dept: ENDOCRINOLOGY | Facility: CLINIC | Age: 44
End: 2024-05-16
Payer: COMMERCIAL

## 2024-05-17 NOTE — TELEPHONE ENCOUNTER
MARK RASCON (Bah: [id number])  PA Case ID #: [id number]  Rx #: [id number]  Need Help? Call us at [phone]  Outcome  Approved on May 16  PA Case: [id number], Status: Approved, Coverage Starts on: 5/16/2024 12:00:00 AM, Coverage Ends on: 5/16/2025 12:00:00 AM.  Authorization Expiration Date: 5/15/2025  Drug  Calcitriol 0.5MCG capsules  ePA cloud logo  Form  [employer] Electronic PA Form (2017

## 2024-08-20 DIAGNOSIS — E83.51 HYPOCALCEMIA: ICD-10-CM

## 2024-08-20 RX ORDER — CALCITRIOL 0.5 UG/1
0.5 CAPSULE, LIQUID FILLED ORAL DAILY
Qty: 90 CAPSULE | Refills: 1 | Status: SHIPPED | OUTPATIENT
Start: 2024-08-20

## 2024-08-20 NOTE — TELEPHONE ENCOUNTER
Rx Refill Note  Requested Prescriptions     Pending Prescriptions Disp Refills    calcitriol (ROCALTROL) 0.5 MCG capsule [Pharmacy Med Name: CALCITRIOL 0.5 MCG CAPSULE] 90 capsule 0     Sig: Take 1 capsule by mouth Daily.      Last office visit with prescribing clinician: 10/9/2023     Next office visit with prescribing clinician: 10/16/2024       Meka Roach MA  08/20/24, 14:32 EDT

## 2024-10-16 ENCOUNTER — OFFICE VISIT (OUTPATIENT)
Dept: ENDOCRINOLOGY | Facility: CLINIC | Age: 44
End: 2024-10-16
Payer: COMMERCIAL

## 2024-10-16 VITALS
DIASTOLIC BLOOD PRESSURE: 78 MMHG | HEIGHT: 65 IN | BODY MASS INDEX: 22.66 KG/M2 | WEIGHT: 136 LBS | OXYGEN SATURATION: 100 % | HEART RATE: 76 BPM | SYSTOLIC BLOOD PRESSURE: 124 MMHG

## 2024-10-16 DIAGNOSIS — E89.0 POSTSURGICAL HYPOTHYROIDISM: Primary | ICD-10-CM

## 2024-10-16 DIAGNOSIS — Z85.850 HISTORY OF THYROID CANCER: ICD-10-CM

## 2024-10-16 DIAGNOSIS — E83.51 HYPOCALCEMIA: ICD-10-CM

## 2024-10-16 LAB
ALBUMIN SERPL-MCNC: 4.6 G/DL (ref 3.5–5.2)
ALBUMIN/GLOB SERPL: 1.6 G/DL
ALP SERPL-CCNC: 58 U/L (ref 39–117)
ALT SERPL W P-5'-P-CCNC: 15 U/L (ref 1–33)
ANION GAP SERPL CALCULATED.3IONS-SCNC: 9.8 MMOL/L (ref 5–15)
AST SERPL-CCNC: 24 U/L (ref 1–32)
BILIRUB SERPL-MCNC: 0.4 MG/DL (ref 0–1.2)
BUN SERPL-MCNC: 17 MG/DL (ref 6–20)
BUN/CREAT SERPL: 13.9 (ref 7–25)
CALCIUM SPEC-SCNC: 8.7 MG/DL (ref 8.6–10.5)
CHLORIDE SERPL-SCNC: 99 MMOL/L (ref 98–107)
CO2 SERPL-SCNC: 27.2 MMOL/L (ref 22–29)
CREAT SERPL-MCNC: 1.22 MG/DL (ref 0.57–1)
EGFRCR SERPLBLD CKD-EPI 2021: 55.9 ML/MIN/1.73
GLOBULIN UR ELPH-MCNC: 2.8 GM/DL
GLUCOSE SERPL-MCNC: 87 MG/DL (ref 65–99)
POTASSIUM SERPL-SCNC: 3.9 MMOL/L (ref 3.5–5.2)
PROT SERPL-MCNC: 7.4 G/DL (ref 6–8.5)
SODIUM SERPL-SCNC: 136 MMOL/L (ref 136–145)
T4 FREE SERPL-MCNC: 1.56 NG/DL (ref 0.92–1.68)
TSH SERPL DL<=0.05 MIU/L-ACNC: 1.5 UIU/ML (ref 0.27–4.2)

## 2024-10-16 PROCEDURE — 80053 COMPREHEN METABOLIC PANEL: CPT | Performed by: INTERNAL MEDICINE

## 2024-10-16 PROCEDURE — 84443 ASSAY THYROID STIM HORMONE: CPT | Performed by: INTERNAL MEDICINE

## 2024-10-16 PROCEDURE — 86800 THYROGLOBULIN ANTIBODY: CPT | Performed by: INTERNAL MEDICINE

## 2024-10-16 PROCEDURE — 84432 ASSAY OF THYROGLOBULIN: CPT | Performed by: INTERNAL MEDICINE

## 2024-10-16 PROCEDURE — 84439 ASSAY OF FREE THYROXINE: CPT | Performed by: INTERNAL MEDICINE

## 2024-10-16 NOTE — PROGRESS NOTES
"Chief Complaint   Patient presents with    Hypothyroidism        HPI   Isabela Myles is a 44 y.o. female had concerns including Hypothyroidism.      Here for yearly follow-up on thyroid cancer, postsurgical hypothyroidism, hypocalcemia.    She is on levothyroxine 137 mcg daily.  Okay but notes some increase in fatigue.  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    Taking calcium and calcitriol consistently.  Rarely needs to take additional doses of calcium for symptomatic hypocalcemia.  Thyroid surgically absent    The following portions of the patient's history were reviewed and updated as appropriate: allergies, current medications, past family history, past medical history, past social history, past surgical history, and problem list.    Review of Systems   Constitutional:  Positive for fatigue.   Psychiatric/Behavioral:  Positive for stress.         /78 (BP Location: Left arm, Patient Position: Sitting, Cuff Size: Adult)   Pulse 76   Ht 165.1 cm (65\")   Wt 61.7 kg (136 lb)   SpO2 100%   BMI 22.63 kg/m²      Physical Exam  Vitals reviewed.   Constitutional:       Appearance: Normal appearance.   Neck:      Thyroid: No thyroid mass.      Comments: Thyroid surgically absent  Cardiovascular:      Rate and Rhythm: Normal rate.   Pulmonary:      Effort: Pulmonary effort is normal.   Lymphadenopathy:      Cervical: No cervical adenopathy.   Neurological:      General: No focal deficit present.      Mental Status: She is alert. Mental status is at baseline.   Psychiatric:         Mood and Affect: Mood normal.         Behavior: Behavior normal.              LABS AND IMAGING   CMP  Lab Results   Component Value Date    GLUCOSE 89 10/09/2023    BUN 18 10/09/2023    CREATININE 1.11 (H) 10/09/2023    EGFRIFNONA 47 (L) 05/03/2021    BCR 16.2 10/09/2023    K 3.4 (L) 10/09/2023    CO2 29.0 10/09/2023    CALCIUM 9.9 10/09/2023    ALBUMIN 4.9 10/09/2023    AST 22 10/09/2023    ALT 14 10/09/2023    "   TSH  Lab Results   Component Value Date    TSH 0.666 10/09/2023    TSH 7.070 (H) 2022    TSH 9.480 (H) 2021     T4  Lab Results   Component Value Date    FREET4 1.61 10/09/2023    FREET4 1.24 2022    FREET4 1.11 2021       Lab Results   Component Value Date    THYROGLOBULN <0.1 (L) 10/09/2023       Lab Results   Component Value Date    THGAB <1.0 10/09/2023       Post-operative Neck Ultrasound  [facility]     PT: Isabela Myles  : 1980  Date:  10/09/23  Indication: History of thyroid cancer.     Technique: Real time high resolution imaging of the anterior neck was performed in longitudinal and transverse planes.     Findings:   Compartment / Level I  (Submandibular): no abnormal lymph nodules bilaterally  Compartment / Level II  (Suprahyoid parajugular): no abnormal lymph nodules bilaterally  Compartment / Level III  (Infrahyoid supracricoid parajugular): no abnormal lymph nodules bilaterally  Compartment / Level IV (Infracricoid parajugular): no abnormal lymph nodules bilaterally  Compartment / Level V (Posterior cervical triangle): no abnormal lymph nodules bilaterally  Compartment / Level VI (Central compartment): no abnormal lymph nodules identified  Compartment / Level VII (Substernal space): no abnormal lymph nodules bilaterally     Impression: The structures of the neck are shifted medially as expected post thyroidectomy. The thyroid bed is unremarkable.   No abnormal lymph nodes were identified in surgical anatomic compartments I - VII.       Recommendation: Repeat Neck US in 2 years.    Assessment and Plan    Diagnoses and all orders for this visit:    1. Postsurgical hypothyroidism (Primary)  Clinically euthyroid on levothyroxine 137 mcg daily.  With recent fatigue.  Discussed possible transition to combination therapy.  Check TFTs today and titrate if needed or will transition to combo therapy with repeat labs in 6 weeks.  -     T4, Free  -     TSH  -      T4, Free; Future  -     TSH; Future  -     T3; Future    2. History of thyroid cancer  High risk papillary with gross capsular extension with adhesions to the surrounding strap muscles and to the undersurface of structures posteriorly, metastasis to the right upper jugular lymph node. Resection 12/8/2009. S/p I-131 and external beam radiation. Excellent response to therapy.  Recheck thyroglobulin with antibodies today.  Levels have been undetectable.   10/9/23 TG <0.1, Ab neg  10/9/23: Neck ultrasound updated with no remnant thyroid tissue, no suspicious lymph nodes.  Check neck US q 2 years if TG level low/stable.  -     Thyroglobulin Antibody and Thyroglobulin, KELVIN or LC/MS-MS    3. Hypocalcemia  Postoperative hypocalcemia. On calcitriol 0.5 mcg daily and calcium 1200 mg daily. Rarely needs to supplement additional Tums for muscle spasms. Check metabolic panel today.  -     Comprehensive Metabolic Panel         Return in about 1 year (around 10/16/2025) for next scheduled follow up, with thyroid ultrasound ** 30 min appt. The patient was instructed to contact the clinic with any interval questions or concerns.    Electronically signed by: Ema Robles DO   Endocrinologist    Please note that portions of this note were completed with a voice recognition program.

## 2024-10-17 DIAGNOSIS — E89.0 POSTSURGICAL HYPOTHYROIDISM: ICD-10-CM

## 2024-10-17 LAB
THYROGLOB AB SERPL-ACNC: <1 IU/ML (ref 0–0.9)
THYROGLOB SERPL-MCNC: <0.1 NG/ML (ref 1.5–38.5)

## 2024-10-17 RX ORDER — LIOTHYRONINE SODIUM 5 UG/1
5 TABLET ORAL DAILY
Qty: 30 TABLET | Refills: 5 | Status: SHIPPED | OUTPATIENT
Start: 2024-10-17 | End: 2025-10-17

## 2024-10-17 RX ORDER — LEVOTHYROXINE SODIUM 125 UG/1
125 TABLET ORAL DAILY
Qty: 30 TABLET | Refills: 5 | Status: SHIPPED | OUTPATIENT
Start: 2024-10-17

## 2025-03-04 DIAGNOSIS — E83.51 HYPOCALCEMIA: ICD-10-CM

## 2025-03-04 RX ORDER — CALCITRIOL 0.5 UG/1
0.5 CAPSULE, LIQUID FILLED ORAL DAILY
Qty: 90 CAPSULE | Refills: 0 | Status: SHIPPED | OUTPATIENT
Start: 2025-03-04 | End: 2025-03-04 | Stop reason: SDUPTHER

## 2025-03-04 RX ORDER — CALCITRIOL 0.5 UG/1
0.5 CAPSULE, LIQUID FILLED ORAL DAILY
Qty: 90 CAPSULE | Refills: 2 | Status: SHIPPED | OUTPATIENT
Start: 2025-03-04

## 2025-03-04 NOTE — TELEPHONE ENCOUNTER
Patient called office, stated that she is needing a refill on her calcitriol. Stated that her script has no more refills. She would like this sent to [facility] in [facility].

## 2025-03-04 NOTE — TELEPHONE ENCOUNTER
Rx Refill Note  Requested Prescriptions     Pending Prescriptions Disp Refills    calcitriol (ROCALTROL) 0.5 MCG capsule [Pharmacy Med Name: CALCITRIOL 0.5 MCG CAPSULE] 90 capsule 0     Sig: Take 1 capsule by mouth Daily.          Last office visit with prescribing clinician: 10/16/2024     Next office visit with prescribing clinician: 10/16/2025         Ashley Jama MA  03/04/25, 11:21 EST

## 2025-04-11 DIAGNOSIS — E89.0 POSTSURGICAL HYPOTHYROIDISM: ICD-10-CM

## 2025-04-11 RX ORDER — LIOTHYRONINE SODIUM 5 UG/1
5 TABLET ORAL DAILY
Qty: 30 TABLET | Refills: 6 | Status: SHIPPED | OUTPATIENT
Start: 2025-04-11

## 2025-04-11 RX ORDER — LEVOTHYROXINE SODIUM 125 UG/1
125 TABLET ORAL DAILY
Qty: 30 TABLET | Refills: 6 | Status: SHIPPED | OUTPATIENT
Start: 2025-04-11

## 2025-04-11 NOTE — TELEPHONE ENCOUNTER
Rx Refill Note  Requested Prescriptions     Pending Prescriptions Disp Refills    liothyronine (CYTOMEL) 5 MCG tablet [Pharmacy Med Name: LIOTHYRONINE SOD 5 MCG TAB] 30 tablet 0     Sig: TAKE ONE TABLET BY MOUTH EVERY DAY    levothyroxine (SYNTHROID, LEVOTHROID) 125 MCG tablet [Pharmacy Med Name: LEVOTHYROXINE 125 MCG TABLET] 30 tablet 0     Sig: TAKE ONE TABLET BY MOUTH EVERY DAY      Last office visit with prescribing clinician: 10/16/2024     Next office visit with prescribing clinician: 10/16/2025     Radha Hernandez MA  04/11/25, 11:26 EDT

## 2025-04-18 ENCOUNTER — PRIOR AUTHORIZATION (OUTPATIENT)
Dept: ENDOCRINOLOGY | Facility: CLINIC | Age: 45
End: 2025-04-18
Payer: COMMERCIAL

## 2025-04-18 NOTE — TELEPHONE ENCOUNTER
PA for Calcitriol submitted via [facility].  This request cannot be processed due to the medication is not covered by the plan.